# Patient Record
Sex: FEMALE | Race: WHITE | URBAN - METROPOLITAN AREA
[De-identification: names, ages, dates, MRNs, and addresses within clinical notes are randomized per-mention and may not be internally consistent; named-entity substitution may affect disease eponyms.]

---

## 2017-01-01 ENCOUNTER — OFFICE VISIT (OUTPATIENT)
Dept: FAMILY MEDICINE CLINIC | Age: 82
End: 2017-01-01

## 2017-01-01 VITALS
RESPIRATION RATE: 22 BRPM | WEIGHT: 101 LBS | DIASTOLIC BLOOD PRESSURE: 83 MMHG | OXYGEN SATURATION: 96 % | TEMPERATURE: 97 F | HEIGHT: 59 IN | BODY MASS INDEX: 20.36 KG/M2 | HEART RATE: 77 BPM | SYSTOLIC BLOOD PRESSURE: 156 MMHG

## 2017-01-01 DIAGNOSIS — E13.9 DM (DIABETES MELLITUS), SECONDARY (HCC): ICD-10-CM

## 2017-01-01 DIAGNOSIS — D63.8 ANEMIA, CHRONIC DISEASE: ICD-10-CM

## 2017-01-01 DIAGNOSIS — C80.1 BILIARY OBSTRUCTION DUE TO CANCER (HCC): Chronic | ICD-10-CM

## 2017-01-01 DIAGNOSIS — C25.9 PANCREATIC ADENOCARCINOMA (HCC): Primary | Chronic | ICD-10-CM

## 2017-01-01 DIAGNOSIS — R10.10 PAIN OF UPPER ABDOMEN: ICD-10-CM

## 2017-01-01 DIAGNOSIS — K83.1 BILIARY OBSTRUCTION DUE TO CANCER (HCC): Chronic | ICD-10-CM

## 2017-01-01 DIAGNOSIS — R63.0 DECREASED APPETITE: ICD-10-CM

## 2017-01-01 DIAGNOSIS — R11.0 NAUSEA: ICD-10-CM

## 2017-01-01 RX ORDER — METOCLOPRAMIDE 5 MG/1
5 TABLET ORAL
COMMUNITY

## 2017-01-01 RX ORDER — INSULIN PUMP SYRINGE, 3 ML
EACH MISCELLANEOUS
Qty: 1 KIT | Refills: 0 | Status: SHIPPED | OUTPATIENT
Start: 2017-01-01

## 2017-01-01 RX ORDER — DRONABINOL 5 MG/1
5 CAPSULE ORAL 2 TIMES DAILY
COMMUNITY

## 2017-01-01 RX ORDER — ONDANSETRON 4 MG/1
4 TABLET, FILM COATED ORAL EVERY 6 HOURS
COMMUNITY

## 2017-01-01 RX ORDER — OXYCODONE AND ACETAMINOPHEN 5; 325 MG/1; MG/1
1 TABLET ORAL 3 TIMES DAILY
COMMUNITY

## 2017-06-27 PROBLEM — K83.1 BILIARY OBSTRUCTION DUE TO CANCER (HCC): Chronic | Status: ACTIVE | Noted: 2017-01-01

## 2017-06-27 PROBLEM — C80.1 BILIARY OBSTRUCTION DUE TO CANCER (HCC): Chronic | Status: ACTIVE | Noted: 2017-01-01

## 2017-06-27 PROBLEM — E11.9 CONTROLLED TYPE 2 DIABETES MELLITUS WITHOUT COMPLICATION, WITHOUT LONG-TERM CURRENT USE OF INSULIN (HCC): Status: ACTIVE | Noted: 2017-01-01

## 2017-06-27 PROBLEM — K83.09 CHOLANGITIS: Status: ACTIVE | Noted: 2017-01-01

## 2017-06-27 PROBLEM — E11.9 CONTROLLED TYPE 2 DIABETES MELLITUS WITHOUT COMPLICATION, WITHOUT LONG-TERM CURRENT USE OF INSULIN (HCC): Chronic | Status: ACTIVE | Noted: 2017-01-01

## 2017-06-27 PROBLEM — K83.1 BILIARY OBSTRUCTION DUE TO CANCER (HCC): Status: ACTIVE | Noted: 2017-01-01

## 2017-06-27 PROBLEM — C25.9 PANCREATIC ADENOCARCINOMA (HCC): Chronic | Status: ACTIVE | Noted: 2017-01-01

## 2017-06-27 PROBLEM — C80.1 BILIARY OBSTRUCTION DUE TO CANCER (HCC): Status: ACTIVE | Noted: 2017-01-01

## 2017-06-27 PROBLEM — C25.9 PANCREATIC ADENOCARCINOMA (HCC): Status: ACTIVE | Noted: 2017-01-01

## 2017-06-27 NOTE — PROGRESS NOTES
PRE-VISIT PLANNING:     Future Appointments  Date Time Provider Rosa Chi   2017 1:00 PM Katherine Barry MD [facility] (PCP is Dulce Avelar MD) is scheduled for an office visit with Katherine Barry MD on 2017 for new patient visit. Encounter opened prior to visit to complete pre-visit planning, update and review pertinent sections in the chart. [facility] chart successfully linked. Rooming Nurse Items:  -- Release for most recent notes from specialists (oncology Dr. Darlen Felty Dr. Donnise Baxter)    Pending items for provider to review with patient:  -- Fasting labs  Health Maintenance Due   Topic Date Due    HEMOGLOBIN A1C Q6M  1933    LIPID PANEL Q1  1933    FOOT EXAM Q1  1943    MICROALBUMIN Q1  1943    EYE EXAM RETINAL OR DILATED Q1  1943    GLAUCOMA SCREENING Q2Y  1998    MEDICARE YEARLY EXAM  1998          New Patient Visit - Internal [address] at [place] [address] Harika Varma, [place], [facility], [address]    Date of Service:  2017   Patient's Name: Tatiana Luis   Patient's :  1933     Subjective/Discussion:     Chief Complaint   Patient presents with   [address] Blood sugar problem        Tatiana Luis is a pleasant 80 y.o. female presenting today to establish care (new patient visit). Here with her daughter, Marjorie Cordon, who is well known to me and who has moved in to assist with her mother's care as well as her father's (he has Parkinson's). She was diagnosed in March with pancreatic adenocarcinoma and has already undergone palliative biliary stenting. She is followed by Dr. Gisell Hinojosa and Dr. Ludy Franco as well as Dr. Brennen Fleming.   She is accepting of her diagnosis and notes calmly that while she isn't sure of the staging of her cancer, she has been told prognosis is 6-12 months survival.  She does not want any further treatments and is not on any steroids currently. She came off all of her chronic medications and is now only on meds to manage symptoms of nausea and pain. She is pain free taking Percocet 1 PO TID. She notes occasional sharp brief discomfort in upper abdomen mostly, but states she doesn't really qualify sensation as pain as it is gone quickly and not severe. She still has nausea which varies in severity. She did not tolerate phenergan, but takes zofran and reglan PRN. She has a poor appetite, so was recently started on marinol which makes her \"a little loopy\" and her daughter notes some irritability on medication as well, but helped significantly with appetite. She notes her weight has decreased in the last 3 months by about 20#s, but that she used to maintain her weight around 100-15#s in young adulthood and she feels she has simply returned to her baseline weight. She feels well today. She notes oncology has been concerned about blood sugars recently. She really does not want to take any medications for this and also prefers not to monitor BG. Past Medical History reviewed and annotated:   Past Medical History:   Diagnosis Date    Anemia, chronic disease     Controlled type 2 diabetes mellitus without complication, without long-term current use of insulin ([facility] 1/21/2017    Overview:  Formatting of this note may be different from the original. Results for Lincoln Recinos (MRN [id number]) as of 1/21/2017 08:02  Ref.  Range 1/20/2017 10:12  HEMOGLOBIN A1C Latest Ref Range: 4.8-5.9 % 6.4 (H)  ESTIMATED AVERAGE GLUCOSE Latest Ref Range:  mg/dL 136 (H)  GTT FASTING Latest Ref Range: 65-99 mg/dL 107 (H)  GLUCOSE 2HR Latest Ref Range:   mg/dL 250  CREATININE URINE MG/DL Latest Units: mg/dL 34  MICROALBUMIN RANDOM Latest Ref Range: 0.1-17.0 ug/mL <12.0  MICROALBUMIN/CREATININE RATIO Latest Ref Range: 0.0-30.0 mcg/mg of Creatinine Pend     Cough secondary to allergic rhinitis 11/16/2015 Overview:  PFT 11/15/15: Pulmonary Function Diagnosis: No obstruction by spirometric indicies Normal lung volumes  Minimal Diffusion Defect -Pulmonary Vascular There are no prior studies for comparison. Clinical correlation is warranted.  DJD (degenerative joint disease) of knee 6/25/2009    Essential hypertension, benign 6/25/2009    Mixed hyperlipidemia 6/25/2009    Osteoporosis 5/2/2014    Patient declined treatment    Palpitations 7/30/2013    Overview:  CXR 7/29/13: NEG  Holter 24 hour 7/31/13: PVCs 4 PACs 14    Pancreatic adenocarcinoma ([facility] 03/15/2017    3/13/17: EUS-guided FNA of pancreatic head mass, 3 Diff-Quik stained smears for  immediate evaluation by pathologist, 3 Pap-stained smears and cell block:     - Positive for moderately differentiated adenocarcinoma.     - Immunostains performed on the core biopsies:        Slide  1: B72.3.......................... Negative.             REVIEW OF SYSTEMS       Complete review of systems negative except as noted in HPI. Physical Exam:     VS:    Visit Vitals    /83 (BP 1 Location: Right arm, BP Patient Position: Sitting)    Pulse 77    Temp 97 °F (36.1 °C) (Oral)    Resp 22    Ht 4' 11\" (1.499 m)    Wt 101 lb (45.8 kg)    SpO2 96%    BMI 20.4 kg/m2       General:   Pleasant elderly female, slender, but appears to be feeling well, in good spirits, well-groomed, conversant, alert, in no acute distress.      HEENT:  Normocephalic, atraumatic, MMM, no thrush, PERRL, EOMI  Neck:  Neck supple with normal ROM for age, no thyromegaly or nodules, no LAD  Cardiovasc:   Regular rate and rhythm, no murmurs, no rubs, no gallops  Pulmonary:   Clear breath sounds bilaterally, good air movement,    No wheezing, no rales, no rhonchi, normal respiratory effort  Abdomen:   Abdomen soft, nontender, nondistended, NABS, no masses, no radiation changes on upper abdominal skin  Extremities:   No pitting edema, no tenderness with palpation of calves, warm and well-perfused at distal extremities  Neuro:   Alert, conversant, following commands, no focal deficits. Gait is narrow based and stable without assistive device  Skin:    No rashes noted. Psych:  Affect and behavior are normal and appropriate    Thought process demonstrated is linear and logical      Assessment/Plan:        Trixie Oliveros was seen today to establish care (new patient visit). This is a very pleasant 80year old who unfortunately has a terminal illness. She is coping very well with the diagnosis and has a strong support system from family. We discussed what her goals of care are and she specifically prefers to take as few medications as possible and not to have to begin regular BG monitoring. She also notes that she would like to spend as much of her time at home enjoying family and friends as possible, rather than attending appointments. She is open to home hospice, but her daughter notes that oncology didn't yet feel hospice services were needed. However, will assist when time comes. As discussed with patient and daughter, will plan to manage symptoms and issues that arise over the phone as much as possible to minimize trips to and from the office. No plans to monitor labs regularly given prognosis. Discussed pancreoprivic diabetes and that management would typically require insulin as issue is destruction/replacement of pancreatic beta cells. For now, patient will only monitor BG PRN if feeling unwell and PRN meds are not assisting with symptoms. No plans to resume lipid lowering therapy or other meds, will use meds only to treat symptoms/maintain comfort. Records from specialists requested. ICD-10-CM ICD-9-CM    1. Pancreatic adenocarcinoma (HCC) C25.9 157.9    2. 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  DM (diabetes mellitus), secondary ([facility] E13.9 249.00 oxyCODONE-acetaminophen (PERCOCET) 5-325 mg per tablet      ondansetron hcl (ZOFRAN, AS HYDROCHLORIDE,) 4 mg tablet      metoclopramide HCl (REGLAN) 5 mg tablet      dronabinol (MARINOL) 5 mg capsule      Blood-Glucose Meter monitoring kit      glucose blood VI test strips (BLOOD GLUCOSE TEST) strip   4. Anemia, chronic disease D63.8 285.29    5. Nausea R11.0 787.02    6. Decreased appetite R63.0 783.0    7. Pain of upper abdomen R10.10 789.09         The patient states understanding and agrees with the treatment plan. Patient's questions were answered. José Miguel Gandhi MD - Internal Medicine  6/27/2017, [facility]    Patient Care Team:  José Miguel Gandhi MD as PCP - General (Internal Medicine)  Maria M Vazquez MD as Consulting Provider (Hematology and Oncology)  Francisco Javier Geiger MD as Consulting Provider (Gastroenterology)  Parth Kingsley MD as Consulting Provider (General Surgery)  Casey Alonso MD as Consulting Provider (Radiation Oncology)     Medications:     Current Outpatient Prescriptions   Medication Sig    oxyCODONE-acetaminophen (PERCOCET) 5-325 mg per tablet Take 1 Tab by mouth three (3) times daily.  ondansetron hcl (ZOFRAN, AS HYDROCHLORIDE,) 4 mg tablet Take 4 mg by mouth every six (6) hours.  metoclopramide HCl (REGLAN) 5 mg tablet Take 5 mg by mouth Before breakfast, lunch, and dinner.  dronabinol (MARINOL) 5 mg capsule Take 5 mg by mouth two (2) times a day. 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         Additional History:     Past Surgical History:   Procedure Laterality Date    HX CATARACT REMOVAL Bilateral     HX HAMMER TOE REPAIR      REV UPPER EYELID W EXCESS SKIN       Social History     Social History    Marital status:      Spouse name: N/A    Number of children: N/A    Years of education: N/A     Social History Main Topics    Smoking status: Never Smoker    Smokeless tobacco: None    Alcohol use 1.2 oz/week     2 Shots of liquor, 0 Glasses of wine, 0 Cans of beer per week    Drug use: No    Sexual activity: No     Other Topics Concern    None     Social History Narrative    6/27/2017:  Daughter, Anna Dougherty, has moved in with her to assist with care since dx of pancreatic adenocarcinoma. Has opted not to pursue any further active treatments (palliative txs are only option). ,  with Parkinson's disease and anxiety. Family History   Problem Relation Age of Onset   [facility] Stroke Mother     Heart Disease Mother     Hypertension Mother     Stroke Father     Heart Disease Father     Hypertension Father     Stroke Sister     Heart Failure Sister     Heart Attack Brother     Breast Cancer Sister     Breast Cancer Sister     Diabetes Brother     Heart Disease Brother      Allergies   Allergen Reactions    Phenergan [Promethazine] Other (comments)     Extrapyramidal effects       Problem List:      Patient Active Problem List   Diagnosis Code    Controlled type 2 diabetes mellitus without complication, without long-term current use of insulin ([address] E11.9    Palpitations R00.2    Pancreatic adenocarcinoma ([address] C25.9    Mixed hyperlipidemia E78.2    Osteoporosis M81.0    Essential hypertension, benign I10    DJD (degenerative joint disease) of knee M17.10    Cough secondary to allergic rhinitis R05    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 In spite of review and editing, text may contain errors, particularly phonetic errors.

## 2017-06-27 NOTE — PROGRESS NOTES
aDmaris Ryan is a 80 y.o. female  1. Have you been to the ER, urgent care clinic since your last visit? Hospitalized since your last visit? No    2. Have you seen or consulted any other health care providers outside of the [address] [place] [address] since your last visit? Include any pap smears or colon screening.  No

## 2017-06-27 NOTE — PATIENT INSTRUCTIONS
INSTRUCTIONS AFTER YOUR VISIT TODAY:     -- We'll try to manage any issues that arise without having to bring you back in to the office. -- If you're feeling ill or shaky, your daughter can help you check your blood sugar. I'm fine with a blood sugar anywhere between 70 and 200. I don't plan to check bloodwork on you regularly and I still want you to eat whatever foods sound appealing to you.

## 2017-06-27 NOTE — Clinical Note
Update Jennie (tickler and comments) and add [facility] documentation encounter noting all subsequent visits with Dr. Spence Silence require 30 minutes.

## 2017-06-27 NOTE — MR AVS SNAPSHOT
Visit Information Date & Time Provider Department Dept. Phone Encounter #  
 6/27/2017  1:00 PM Nhangetachew Richardson, 3 [facility] [phone] [id number] Follow-up Instructions Routing History Upcoming Health Maintenance Date Due HEMOGLOBIN A1C Q6M 8/30/1933 LIPID PANEL Q1 8/30/1933 FOOT EXAM Q1 8/30/1943 MICROALBUMIN Q1 8/30/1943 EYE EXAM RETINAL OR DILATED Q1 8/30/1943 DTaP/Tdap/Td series (1 - Tdap) 8/30/1954 ZOSTER VACCINE AGE 60> 8/30/1993 GLAUCOMA SCREENING Q2Y 8/30/1998 OSTEOPOROSIS SCREENING (DEXA) 8/30/1998 Pneumococcal 65+ High/Highest Risk (1 of 2 - PCV13) 8/30/1998 MEDICARE YEARLY EXAM 8/30/1998 INFLUENZA AGE 9 TO ADULT 8/1/2017 Allergies as of 6/27/2017  Review Complete On: 6/27/2017 By: Haleigh Bai MD  
  
 Severity Noted Reaction Type Reactions Phenergan [Promethazine]  06/27/2017    Other (comments) Extrapyramidal effects Current Immunizations  Reviewed on 6/27/2017 Name Date Influenza Vaccine 9/1/2016 12:00 AM  
 Pneumococcal Conjugate (PCV-13) 4/14/2015 12:00 AM  
 Pneumococcal Polysaccharide (PPSV-23) 1/1/1999 12:00 AM  
 Tdap 11/15/2016 12:00 AM  
 Zoster Vaccine, Live 7/18/2007 12:00 AM  
  
 Reviewed by Haleigh Bai MD on 6/27/2017 at  7:04 AM  
 Reviewed by Haleigh Bai MD on 6/27/2017 at  7:08 AM  
You Were Diagnosed With   
  
 Codes Comments Pancreatic adenocarcinoma ([facility]    -  Primary ICD-10-CM: C25.9 ICD-9-CM: 157.9 DM (diabetes mellitus), secondary ([facility]     ICD-10-CM: E13.9 ICD-9-CM: 249.00 Vitals BP Pulse Temp Resp Height(growth percentile) Weight(growth percentile) 156/83 (BP 1 Location: Right arm, BP Patient Position: Sitting) 77 97 °F (36.1 °C) (Oral) 22 4' 11\" (1.499 m) 101 lb (45.8 kg) SpO2 BMI Smoking Status 96% 20.4 kg/m2 Never Smoker Vitals History BMI and BSA Data Body Mass Index Body Surface Area 20.4 kg/m 2 1.38 m 2 Preferred Pharmacy Pharmacy Name Phone [address], [address]  [facility] [phone] Your Updated Medication List  
  
   
This list is accurate as of: 6/27/17  1:44 PM.  Always use your most recent med list.  
  
  
  
  
 Blood-Glucose Meter monitoring kit Daily PRN BG monitoring for Pancreoprivic diabetes secondary to pancreatic CA  E13.9  
  
 glucose blood VI test strips strip Commonly known as:  blood glucose test  
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 PERCOCET 5-325 mg per tablet Generic drug:  oxyCODONE-acetaminophen Take 1 Tab by mouth three (3) times daily. REGLAN 5 mg tablet Generic drug:  metoclopramide HCl Take 5 mg by mouth Before breakfast, lunch, and dinner. ZOFRAN (AS HYDROCHLORIDE) 4 mg tablet Generic drug:  ondansetron hcl Take 4 mg by mouth every six (6) hours. Prescriptions Sent to Pharmacy Refills Blood-Glucose Meter monitoring kit 0 Sig: Daily PRN BG monitoring for Pancreoprivic diabetes secondary to pancreatic CA  E13.9 Class: Normal  
 Pharmacy: [facility] [address] [facility] [address] Ph #: [phone]  
 glucose blood VI test strips (BLOOD GLUCOSE TEST) strip 3 Sig: Daily PRN BG monitoring for Pancreoprivic diabetes secondary to pancreatic CA  E13.9 Class: Normal  
 Pharmacy: [facility] [address], [address] Ph #: [phone] Patient Instructions INSTRUCTIONS AFTER YOUR VISIT TODAY:  
 
-- We'll try to manage any issues that arise without having to bring you back in to the office. -- If you're feeling ill or shaky, your daughter can help you check your blood sugar. I'm fine with a blood sugar anywhere between 70 and 200. I don't plan to check bloodwork on you regularly and I still want you to eat whatever foods sound appealing to you. Introducing [facility]! Giovana Dennis introduces [employer] patient portal. Now you can access parts of your medical record, email your doctor's office, and request medication refills online. 1. In your internet browser, go to [email or web address] 2. Click on the First Time User? Click Here link in the Sign In box. You will see the New Member Sign Up page. 3. Enter your [employer] Access Code exactly as it appears below. You will not need to use this code after youve completed the sign-up process. If you do not sign up before the expiration date, you must request a new code. · [employer] Access Code: [email or web address] Expires: 9/25/2017 12:42 PM 
 
4. Enter the last four digits of your Social Security Number (xxxx) and Date of Birth (mm/dd/yyyy) as indicated and click Submit. You will be taken to the next sign-up page. 5. Create a [employer] ID. This will be your [employer] login ID and cannot be changed, so think of one that is secure and easy to remember. 6. Create a [employer] password. You can change your password at any time. 7. Enter your Password Reset Question and Answer. This can be used at a later time if you forget your password. 8. Enter your e-mail address. You will receive e-mail notification when new information is available in [address] 9. Click Sign Up. You can now view and download portions of your medical record. 10. Click the Download Summary menu link to download a portable copy of your medical information. If you have questions, please visit the Frequently Asked Questions section of the [employer] website. Remember, [employer] is NOT to be used for urgent needs. For medical emergencies, dial 911. Now available from your iPhone and Android! Please provide this summary of care documentation to your next provider. Your primary care clinician is listed as Rodriguez Littlejohn. If you have any questions after today's visit, please call [phone].